# Patient Record
Sex: MALE | Race: WHITE | NOT HISPANIC OR LATINO
[De-identification: names, ages, dates, MRNs, and addresses within clinical notes are randomized per-mention and may not be internally consistent; named-entity substitution may affect disease eponyms.]

---
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
## 2020-08-10 ENCOUNTER — OFFICE VISIT - HEALTHEAST (OUTPATIENT)
Dept: INTERNAL MEDICINE | Facility: CLINIC | Age: 20
End: 2020-08-10

## 2020-08-10 DIAGNOSIS — G43.109 MIGRAINE WITH AURA AND WITHOUT STATUS MIGRAINOSUS, NOT INTRACTABLE: ICD-10-CM

## 2020-08-10 DIAGNOSIS — G44.85 PRIMARY STABBING HEADACHE: ICD-10-CM

## 2020-08-10 DIAGNOSIS — R07.89 ATYPICAL CHEST PAIN: ICD-10-CM

## 2020-08-10 LAB
ATRIAL RATE - MUSE: 63 BPM
DIASTOLIC BLOOD PRESSURE - MUSE: NORMAL
INTERPRETATION ECG - MUSE: NORMAL
P AXIS - MUSE: 46 DEGREES
PR INTERVAL - MUSE: 144 MS
QRS DURATION - MUSE: 94 MS
QT - MUSE: 386 MS
QTC - MUSE: 395 MS
R AXIS - MUSE: 57 DEGREES
SYSTOLIC BLOOD PRESSURE - MUSE: NORMAL
T AXIS - MUSE: 51 DEGREES
VENTRICULAR RATE- MUSE: 63 BPM

## 2020-08-10 ASSESSMENT — MIFFLIN-ST. JEOR: SCORE: 1681.91

## 2021-06-01 VITALS — HEIGHT: 69 IN | WEIGHT: 139 LBS | BODY MASS INDEX: 20.59 KG/M2

## 2021-06-02 VITALS — HEIGHT: 69 IN | BODY MASS INDEX: 20.59 KG/M2 | WEIGHT: 139 LBS

## 2021-06-02 VITALS — BODY MASS INDEX: 20.98 KG/M2 | WEIGHT: 140 LBS

## 2021-06-04 VITALS
HEART RATE: 78 BPM | HEIGHT: 69 IN | DIASTOLIC BLOOD PRESSURE: 64 MMHG | SYSTOLIC BLOOD PRESSURE: 98 MMHG | WEIGHT: 152 LBS | OXYGEN SATURATION: 99 % | BODY MASS INDEX: 22.51 KG/M2

## 2021-06-10 NOTE — PROGRESS NOTES
" ASSESSMENT AND PLAN:    1. Atypical chest pain  Exam and history very consistent with left third costochondritis.  EKG is normal. Reassured.    - Electrocardiogram Perform and Read    2. Primary stabbing headache  A localized area of pain on the left upper posterior scalp, no findings.  Related, very likely, to worry of 'aneurysm' like his grandmother.  No other neurologic symptoms or findings.  Reassured.      3. Migraine with aura and without status migrainosus, not intractable  History is consistent with migraine with aura. We discussed precipitating factors, and treatment with caffeine and NSAID rx.      Follow up every few years and prn.    CHIEF COMPLAINT:  Chief Complaint   Patient presents with     Headache     Chest Pain     left side     HISTORY OF PRESENT ILLNESS:  Clayton Preston is a 20 y.o. male here with concerns about a left upper chest pain. This can be sharp, also dull, and transient.  Not pleuritic and not exertional.  He has noted no decrease in exercise tolerance.  He also notes a localized 3cm round area of 'headache' in the left upper parietal scalp area.  No visual or arm or leg neurologic symptoms.  No nausea.  He also gets occasional migraine with aura and has since youth. He is aware of relation to wine, and to stress, lack of sleep, etc.  He treats the migraine with rest - \"I have to sleep it off'.  He otherwise feels well.  Tolerating Covid lock down.  No fever, or cough or unusual fatigue.  No bowel or bladder symptoms.      REVIEW OF SYSTEMS:   See HPI, all other systems on review are negative.    Past Medical History:   Diagnosis Date     Migraine with aura and without status migrainosus 8/10/2020     Peanut allergy 8/30/2018     Social History     Tobacco Use   Smoking Status Never Smoker   Smokeless Tobacco Never Used     Family History   Problem Relation Age of Onset     Bipolar disorder Father      Alcoholism Father      Pacemaker Maternal Grandfather      Pacemaker " "Paternal Grandfather      Eating disorder Mother      Cerebral aneurysm Paternal Grandmother      Past Surgical History:   Procedure Laterality Date     URETHRAL DILATION       VITALS:  Vitals:    08/10/20 1434   BP: 98/64   Patient Site: Left Arm   Patient Position: Sitting   Cuff Size: Adult Regular   Pulse: 78   SpO2: 99%   Weight: 152 lb (68.9 kg)   Height: 5' 8.5\" (1.74 m)     Wt Readings from Last 3 Encounters:   08/10/20 152 lb (68.9 kg)   10/05/18 140 lb (63.5 kg) (34 %, Z= -0.40)*   09/17/18 139 lb (63 kg) (33 %, Z= -0.44)*     * Growth percentiles are based on CDC (Boys, 2-20 Years) data.     PHYSICAL EXAM:  Constitutional:  In NAD, alert and oriented  HEENT: nose and throat clear, ears normal  EYE: fundi are unremarkable, discs flat  Neck: no cervical or axillary adenopathy  Cardiac:  S1 S2   Lungs: Clear   Abdomen:   Soft, flat and non-tender, without guarding, rebound, or mass.    Extremities: no joint effusion, no significant edema  Neurologic:  Speech clear, no arm or leg weakness, gait normal     Psychiatric:  Mood and behavior appropriate, thinking is clear.     DECISION TO OBTAIN OLD RECORDS AND/OR OBTAIN HISTORY FROM SOMEONE OTHER THAN PATIENT, AND/OR ACCESSING CARE EVERYWHERE):  1  0     REVIEW AND SUMMARIZATION OF OLD RECORDS, AND/OR OBTAINING HISTORY FROM SOMEONE OTHER THAN PATIENT, AND/OR DISCUSSION OF CASE WITH ANOTHER HEALTH CARE PROVIDER:  2 0    REVIEW AND/OR ORDER OF OF CLINICAL LAB TESTS: 1  0.    REVIEW AND/OR ORDER OF RADIOLOGY TESTS: 1 0.    REVIEW AND/OR ORDER OF MEDICAL TESTS (EKG/ECHO/COLONOSCOPY/EGD): 1  ordered    INDEPENDENT  VISUALIZATION OF IMAGE, TRACING, OR SPECIMEN ITSELF (2 EACH):  Personally viewed and interpreted EKG and reviewed with the patient.      TOTAL: 2    Current Outpatient Medications   Medication Sig Dispense Refill     EPINEPHrine (EPIPEN/ADRENACLICK/AUVI-Q) 0.3 mg/0.3 mL injection Inject 0.3 mL (0.3 mg total) as directed as needed for anaphylaxis. Inject " into thigh. 2 Pre-filled Pen Syringe 0     Ankur Lopez MD  Internal Medicine  [facility]

## 2021-06-16 PROBLEM — Z91.010 PEANUT ALLERGY: Status: ACTIVE | Noted: 2018-08-30

## 2021-06-16 PROBLEM — G43.109 MIGRAINE WITH AURA AND WITHOUT STATUS MIGRAINOSUS: Status: ACTIVE | Noted: 2020-08-10

## 2021-06-20 NOTE — PROGRESS NOTES
Impression:  Constipation and right lower quadrant abdominal pain gradually progressive.  Differential diagnosis includes acute appendicitis or irritable bowel syndrome    Plan:  CT scan of the abdomen and pelvis and CBC.  If he has appendicitis he will need to be admitted to the hospital for appendectomy.  If the CT is negative will discharge him to home to drink fluids and increase the dose of MiraLAX, and follow-up with primary care      Chief Complaint:  Chief Complaint   Patient presents with     poss Constipated     x7days          HPI:   Clayton Preston is a 18 y.o. male who presents to this clinic for the evaluation of constipation and abdominal pain.  The patient complains of a 7-day history of constipation.  Has had small amounts of hard stool but no good bowel movement for the past 7 days.  Over the past 24 hours he is developed gradual onset of abdominal pain that has localized into the right lower quadrant.  He denies any nausea or vomiting.  He last ate about 1 hour ago.  No melena or hematochezia.  He has had intermittent constipation for many years.  He is tried MiraLAX and fiber and enemas and mineral oil without relief over the past 7 days.  The abdominal pain is moderate and gradually progressive      PMH:   Past Medical History:   Diagnosis Date     Peanut allergy 8/30/2018     No past surgical history on file.      ROS:  All other systems negative    Meds:    Current Outpatient Prescriptions:      EPINEPHrine (EPIPEN/ADRENACLICK/AUVI-Q) 0.3 mg/0.3 mL injection, Inject 0.3 mL (0.3 mg total) as directed as needed for anaphylaxis. Inject into thigh., Disp: 2 Pre-filled Pen Syringe, Rfl: 0        Social:  Social History     Social History     Marital status: Single     Spouse name: N/A     Number of children: N/A     Years of education: N/A     Occupational History     Not on file.     Social History Main Topics     Smoking status: Never Smoker     Smokeless tobacco: Never Used     Alcohol  use Not on file     Drug use: Not on file     Sexual activity: Not on file     Other Topics Concern     Not on file     Social History Narrative     No narrative on file         Physical Exam:  Vital signs reviewed  Eyes: PERRL, EOMI  Head: Atraumatic and normocephalic  Pharynx: Clear, airway patent  Neck: No mass or tenderness  Abdomen: There is tenderness in the right lower quadrant at McBurney's point.  No rebound tenderness no mass no other areas of tenderness  Rectal: External hemorrhoid, there is no stool in the rectal vault, there is no tenderness or mass  Extremities: No tenderness or deformity  Skin: No lesions or rash  Neuro: Normal motor and sensory function in all extremities  Psych: Awake, alert, normally responsive      Results:    No results found for this or any previous visit (from the past 24 hour(s)).    No results found.      Ankur Albarran MD

## 2021-06-20 NOTE — PROGRESS NOTES
ASSESSMENT/PLAN:    1. Peanut allergy  Refill epi-pen and referral.  He would like to have re-testing.  No history of actual anaphylaxis.   - Ambulatory referral to Allergy    2. Intractable chronic migraine without aura and without status migrainosus  Episodic migraine, mostly common type, occasional near classical type.  Varying caffeine may play a role.  We discussed triggers, the role of caffeine, and treatment with caffeine and ibuprofen.  He will follow up if not satisfactory.      CHIEF COMPLAINT:  Chief Complaint   Patient presents with     Headache     Mass     bump on face     Referral     allergies referral      Injections     flu     HISTORY OF PRESENT ILLNESS:  Clayton is a 18 y.o. male presenting to the clinic today with history of peanut allergy.  No melissa anaphylaxis but youth reaction.  Would like to be re-tested.  Has otherwise been well.  He does get episodic headaches with photophobia, sometimes wavy visual disturbance, and rarely dark scotomata.  Resolve with quiet room, sometimes with caffeine, and rest.  Does also get nausea, and some diaphoresis.  Also notes different kind of milder headache that is bifrontal and occipital.  Starting college. Tolerates exercise and is overall otherwise doing well.  No significant anxiety disorder per history with mother present. No tobacco, significant alcohol or drug use.     REVIEW OF SYSTEMS:   Constitutional: no fever, chills, or sweats  Respiratory: No wheezes, cough, shortness of breath  Cardiovascular: No chest pain or palpitations  Neurological: see HPI  All other systems on reveiw are negative.    PFSH:    History   Smoking Status     Never Smoker   Smokeless Tobacco     Never Used     Social History     Social History     Marital status: Single     Spouse name: N/A     Number of children: N/A     Years of education: N/A     Occupational History     Not on file.     Social History Main Topics     Smoking status: Never Smoker     Smokeless tobacco:  "Never Used     Alcohol use Not on file     Drug use: Not on file     Sexual activity: Not on file     Other Topics Concern     Not on file     Social History Narrative     No narrative on file     No past surgical history on file.    Allergies   Allergen Reactions     Peanut      Active Ambulatory Problems     Diagnosis Date Noted     Peanut allergy 08/30/2018     Resolved Ambulatory Problems     Diagnosis Date Noted     No Resolved Ambulatory Problems     Past Medical History:   Diagnosis Date     Peanut allergy 8/30/2018     VITALS:  Vitals:    08/30/18 1536   BP: 98/68   Patient Site: Left Arm   Patient Position: Sitting   Cuff Size: Adult Regular   Pulse: 70   SpO2: 98%   Weight: 139 lb (63 kg)   Height: 5' 8.5\" (1.74 m)     Wt Readings from Last 3 Encounters:   08/30/18 139 lb (63 kg) (33 %, Z= -0.43)*     * Growth percentiles are based on [facility] 2-20 Years data.     Body mass index is 20.83 kg/(m^2).    PHYSICAL EXAM:  General Appearance: In no acute distress  BP 98/68 (Patient Site: Left Arm, Patient Position: Sitting, Cuff Size: Adult Regular)  Pulse 70  Ht 5' 8.5\" (1.74 m)  Wt 139 lb (63 kg)  SpO2 98%  BMI 20.83 kg/m2  EYES: Clear, without inflammation, fundi are normal.    HEENT: Without congestion or inflammation  NECK:  supple, without adenopathy or thryroid enlargement  RESPIRATORY: Clear to auscultation  CARDIOVASCULAR: S1, S2, without murmur   ABDOMEN: soft, flat, and non-tender, without mass, rebound, or guarding    "

## 2021-06-20 NOTE — PROGRESS NOTES
Assessment:    Continued peanut allergy  Oral allergy syndrome.  birch pollen    Plan:    Avoidance of peanut.  Other nuts can be eaten.  Discussed oral allergy syndrome.  Handout given.  Food allergy action plan   Testing every 5 years  ____________________________________________________________________________     Patient comes in today for evaluation of peanut allergy.  He reports having peanut allergy since childhood.  At less than 1 year he had diffuse rash, swelling in throat tightening.  They took him to the emergency room at that time.  Details are not known.  He said to episodes in which she has had to go to the emergency room.  At time she has had lip swelling.  Also throat tightness.  He is received epinephrine there with immediate improvement.  It has been at least 3 years since he has had any accidental peanut ingestion.  He does carry an epinephrine device.  In addition, he has symptoms of itchy mouth with raw apples and peaches.  No description of rash with these foods.  He does have environmental allergies as well with history of itchy watery eyes, sneezing, rhinorrhea and nasal congestion.    Review of symptoms:  As above, otherwise negative    Past medical history: No other chronic medical conditions noted.    Allergies: No known allergies to medications, latex,  or hymenoptera venom    Family history: Father with allergies and asthma.    Social history: Currently lives in house with radiator heat.  There is a dog in the home.  No cigarette smoking history.  He is a student at college.    Medications: reviewed in chart    Physical Exam:  General:  Alert and in no apparent distress.  Eyes:  Sclera clear.  Ears: TMs translucent grey with bony landmarks visible. Nose: Pale, boggy mucosal membranes.  Throat: Pink, moist.  No lesions.  Neck: Supple.  No lymphadenopathy.  Lungs: CTA.  CV: Regular rate and rhythm. Extremities: Well perfused.  No clubbing or cyanosis. Skin: No rash    Last Food Skin  Allergy Test Results  Plant Nuts  Peanut  1:20 (W/F in mm): 19/38 (09/17/18 1530)  Controls  Neg. Control: 50% Glycerine-Saline H (W/F in millimeters): 0/0 (09/17/18 1530)  Pos. Control Histamine 6 mg/ml (W/F in millimeters): 8/22 (09/17/18 1530)